# Patient Record
Sex: MALE | URBAN - METROPOLITAN AREA
[De-identification: names, ages, dates, MRNs, and addresses within clinical notes are randomized per-mention and may not be internally consistent; named-entity substitution may affect disease eponyms.]

---

## 2021-08-17 ENCOUNTER — APPOINTMENT (RX ONLY)
Dept: URBAN - METROPOLITAN AREA CLINIC 151 | Facility: CLINIC | Age: 51
Setting detail: DERMATOLOGY
End: 2021-08-17

## 2021-08-17 DIAGNOSIS — L71.8 OTHER ROSACEA: ICD-10-CM

## 2021-08-17 PROCEDURE — ? COUNSELING

## 2021-08-17 PROCEDURE — 99204 OFFICE O/P NEW MOD 45 MIN: CPT

## 2021-08-17 PROCEDURE — ? PRESCRIPTION

## 2021-08-17 PROCEDURE — ? DIAGNOSIS COMMENT

## 2021-08-17 RX ORDER — IVERMECTIN 10 MG/G
CREAM TOPICAL QAM
Qty: 45 | Refills: 2 | Status: ERX | COMMUNITY
Start: 2021-08-17

## 2021-08-17 RX ADMIN — IVERMECTIN: 10 CREAM TOPICAL at 00:00

## 2021-08-17 NOTE — PROCEDURE: COUNSELING
Patient Specific Counseling (Will Not Stick From Patient To Patient): -discussed ways that can exacerbate rash.\\n-recommended to apply soolantra cream QD PRN and if not resolved, it might be Seborrheic dermatitis. \\n-Disc’d plan to treat with Seborrheic dermatitis topical cream if rash doesn’t resolve\\n-Disc’d the option of starting to two creams at once and to apply one in the morning and one at night.
Detail Level: Zone

## 2021-08-17 NOTE — PROCEDURE: DIAGNOSIS COMMENT
Comment: Rosacea vs Seb Derm, Favor rosacea. Recommended pt to apply Soolantra cream QD PRN. Rx sent. Discussed plan to prescribe sulfa cream if no improvement to tx for sd/rosacea overlap
Render Risk Assessment In Note?: no
Detail Level: Simple

## 2021-08-17 NOTE — HPI: RASH
Is This A New Presentation, Or A Follow-Up?: Rash
Additional History: Pt hasn’t tried to treat the rash with anything. Pt has a history of acne.

## 2021-08-30 ENCOUNTER — RX ONLY (OUTPATIENT)
Age: 51
Setting detail: RX ONLY
End: 2021-08-30

## 2021-08-30 RX ORDER — METRONIDAZOLE 7.5 MG/G
CREAM TOPICAL
Qty: 45 | Refills: 2 | Status: ERX | COMMUNITY
Start: 2021-08-30